# Patient Record
Sex: MALE | URBAN - METROPOLITAN AREA
[De-identification: names, ages, dates, MRNs, and addresses within clinical notes are randomized per-mention and may not be internally consistent; named-entity substitution may affect disease eponyms.]

---

## 2022-12-08 ENCOUNTER — APPOINTMENT (OUTPATIENT)
Dept: URBAN - METROPOLITAN AREA CLINIC 247 | Age: 32
Setting detail: DERMATOLOGY
End: 2022-12-09

## 2022-12-08 DIAGNOSIS — D49.2 NEOPLASM OF UNSPECIFIED BEHAVIOR OF BONE, SOFT TISSUE, AND SKIN: ICD-10-CM

## 2022-12-08 DIAGNOSIS — L40.0 PSORIASIS VULGARIS: ICD-10-CM

## 2022-12-08 PROCEDURE — A4550 SURGICAL TRAYS: HCPCS

## 2022-12-08 PROCEDURE — OTHER SHAVE REMOVAL: OTHER

## 2022-12-08 PROCEDURE — OTHER PRESCRIPTION: OTHER

## 2022-12-08 PROCEDURE — OTHER PRESCRIPTION MEDICATION MANAGEMENT: OTHER

## 2022-12-08 PROCEDURE — OTHER COUNSELING: OTHER

## 2022-12-08 PROCEDURE — 11300 SHAVE SKIN LESION 0.5 CM/<: CPT | Mod: 76

## 2022-12-08 PROCEDURE — OTHER MIPS QUALITY: OTHER

## 2022-12-08 PROCEDURE — 99214 OFFICE O/P EST MOD 30 MIN: CPT | Mod: 25

## 2022-12-08 PROCEDURE — 11300 SHAVE SKIN LESION 0.5 CM/<: CPT

## 2022-12-08 RX ORDER — TAPINAROF 10 MG/1000MG
CREAM TOPICAL
Qty: 60 | Refills: 3 | Status: CANCELLED
Stop reason: CLARIF

## 2022-12-08 ASSESSMENT — LOCATION ZONE DERM
LOCATION ZONE: SCALP
LOCATION ZONE: FACE
LOCATION ZONE: TRUNK
LOCATION ZONE: ARM
LOCATION ZONE: GENITALIA

## 2022-12-08 ASSESSMENT — LOCATION SIMPLE DESCRIPTION DERM
LOCATION SIMPLE: SCROTUM
LOCATION SIMPLE: RIGHT UPPER ARM
LOCATION SIMPLE: CHEST
LOCATION SIMPLE: RIGHT CHEEK
LOCATION SIMPLE: POSTERIOR SCALP

## 2022-12-08 ASSESSMENT — LOCATION DETAILED DESCRIPTION DERM
LOCATION DETAILED: POSTERIOR MID-PARIETAL SCALP
LOCATION DETAILED: RIGHT ANTERIOR DISTAL UPPER ARM
LOCATION DETAILED: LEFT MEDIAL SUPERIOR CHEST
LOCATION DETAILED: RIGHT SUPERIOR CENTRAL MALAR CHEEK
LOCATION DETAILED: RIGHT ANTERIOR SCROTUM

## 2022-12-08 NOTE — PROCEDURE: SHAVE REMOVAL
Render Post-Care Instructions In Note?: yes
X Size Of Lesion In Cm (Optional): 0
Hemostasis: Drysol
Size Of Lesion In Cm (Required): 0.5
Wound Care: Petrolatum
Medical Necessity Information: It is in your best interest to select a reason for this procedure from the list below. All of these items fulfill various CMS LCD requirements except the new and changing color options.
Hide Shave Removal Depth?: No
Consent was obtained from the patient. The patient was provided with the informed consent document and offered time to review and ask any further questions before signing consent. Prior to the procedure, the treatment site was clearly identified.
Biopsy Method: Personna blade
Billing Type: Third-Party Bill
Medical Necessity Clause: This procedure was medically necessary because the lesion that was treated was:
Path Notes (To The Dermatopathologist): Check margins
Notification Instructions: Patient will be notified of pathology results. However, patient should call the office if not contacted within 2 weeks.
Post-Care Instructions: I reviewed with the patient in detail post-care instructions. Keep the biopsy site dry overnight, and then wash once daily with a gentle cleanser. Afterwards, pat dry and apply Vaseline twice daily and cover with a bandage until healed. For optimal wound healing, apply SPF 30+ or keep covered until pigmentation has faded.
Detail Level: Detailed
Size Of Lesion In Cm (Required): 0.4
Depth Of Shave: dermis
Anesthesia Type: 1% lidocaine with epinephrine
Body Location Override (Optional - Billing Will Still Be Based On Selected Body Map Location If Applicable): right anterior upper arm distal
Body Location Override (Optional - Billing Will Still Be Based On Selected Body Map Location If Applicable): right anterior upper arm proximal

## 2022-12-08 NOTE — PROCEDURE: COUNSELING
Detail Level: Zone
Moisturizers Recommendations: CeraVe or Cetaphil
Patient Specific Counseling (Will Not Stick From Patient To Patient): Pt reports joint pain and swelling\\nRecommended f/u with rheumatologist for E&M of possible psoriatic arthritis\\nPreviously on Humira

## 2022-12-08 NOTE — PROCEDURE: PRESCRIPTION MEDICATION MANAGEMENT
Render In Strict Bullet Format?: No
Initiate Treatment: Vtama 1 % topical cream QD
Detail Level: Zone
Samples Given: VTAMA

## 2023-01-13 ENCOUNTER — APPOINTMENT (OUTPATIENT)
Dept: URBAN - METROPOLITAN AREA CLINIC 247 | Age: 33
Setting detail: DERMATOLOGY
End: 2023-01-13

## 2023-01-13 ENCOUNTER — RX ONLY (RX ONLY)
Age: 33
End: 2023-01-13

## 2023-01-13 DIAGNOSIS — R21 RASH AND OTHER NONSPECIFIC SKIN ERUPTION: ICD-10-CM

## 2023-01-13 DIAGNOSIS — L40.0 PSORIASIS VULGARIS: ICD-10-CM

## 2023-01-13 PROCEDURE — OTHER HUMIRA INITIATION: OTHER

## 2023-01-13 PROCEDURE — OTHER PRESCRIPTION: OTHER

## 2023-01-13 PROCEDURE — 99214 OFFICE O/P EST MOD 30 MIN: CPT

## 2023-01-13 PROCEDURE — OTHER ORDER TESTS: OTHER

## 2023-01-13 PROCEDURE — OTHER COUNSELING: OTHER

## 2023-01-13 PROCEDURE — OTHER PRESCRIPTION MEDICATION MANAGEMENT: OTHER

## 2023-01-13 PROCEDURE — OTHER ADDITIONAL NOTES: OTHER

## 2023-01-13 RX ORDER — ADALIMUMAB 40MG/0.8ML
KIT SUBCUTANEOUS Q2WEEKS
Qty: 24 | Refills: 0 | Status: ERX | COMMUNITY
Start: 2023-01-12

## 2023-01-13 RX ORDER — ADALIMUMAB 40MG/0.8ML
KIT SUBCUTANEOUS
Qty: 2 | Refills: 5 | Status: ERX

## 2023-01-13 ASSESSMENT — LOCATION SIMPLE DESCRIPTION DERM
LOCATION SIMPLE: RIGHT CHEEK
LOCATION SIMPLE: CHEST
LOCATION SIMPLE: PENIS
LOCATION SIMPLE: POSTERIOR SCALP

## 2023-01-13 ASSESSMENT — LOCATION ZONE DERM
LOCATION ZONE: FACE
LOCATION ZONE: TRUNK
LOCATION ZONE: PENIS
LOCATION ZONE: SCALP

## 2023-01-13 ASSESSMENT — LOCATION DETAILED DESCRIPTION DERM
LOCATION DETAILED: LEFT MEDIAL SUPERIOR CHEST
LOCATION DETAILED: RIGHT SUPERIOR CENTRAL MALAR CHEEK
LOCATION DETAILED: LEFT DORSAL SHAFT OF PENIS
LOCATION DETAILED: POSTERIOR MID-PARIETAL SCALP

## 2023-01-13 ASSESSMENT — BSA PSORIASIS: % BODY COVERED IN PSORIASIS: 6

## 2023-01-13 NOTE — PROCEDURE: PRESCRIPTION MEDICATION MANAGEMENT
Initiate Treatment: Humira
Discontinue Regimen: Vtama 1 % topical cream
Detail Level: Zone
Render In Strict Bullet Format?: No

## 2023-01-13 NOTE — PROCEDURE: COUNSELING
Moisturizers Recommendations: CeraVe or Cetaphil
Detail Level: Detailed
Detail Level: Zone
Patient Specific Counseling (Will Not Stick From Patient To Patient): Pt reports joint pain and swelling\\nRecommended f/u with rheumatologist for E&M of possible psoriatic arthritis\\nPreviously on Humira

## 2023-01-13 NOTE — PROCEDURE: HUMIRA INITIATION
Is Dapsone Contraindicated?: No
Pregnancy And Lactation Warning Text: This medication is Pregnancy Category B and is considered safe during pregnancy. It is unknown if this medication is excreted in breast milk.
Detail Level: Zone
Humira Dosing: 80 mg SC day 0, 40 mg SC day 7, then 40 mg SC every other week
Diagnosis (Required): Psoriasis
Humira Monitoring Guidelines: A yearly test for tuberculosis is required while taking Humira. Baseline labs ordered today

## 2023-01-13 NOTE — PROCEDURE: ADDITIONAL NOTES
Render Risk Assessment In Note?: no
Detail Level: Simple
Additional Notes: GenitAl and scalp involvement. Inadequate improvement on topical steroids. Pt has\\nPt reports joint pain/stiffness.

## 2023-02-02 ENCOUNTER — APPOINTMENT (OUTPATIENT)
Dept: URBAN - METROPOLITAN AREA CLINIC 247 | Age: 33
Setting detail: DERMATOLOGY
End: 2023-02-03

## 2023-02-02 DIAGNOSIS — L40.0 PSORIASIS VULGARIS: ICD-10-CM

## 2023-02-02 PROCEDURE — OTHER ORDER TESTS: OTHER

## 2023-04-06 ENCOUNTER — APPOINTMENT (OUTPATIENT)
Dept: URBAN - METROPOLITAN AREA CLINIC 247 | Age: 33
Setting detail: DERMATOLOGY
End: 2023-04-06

## 2023-04-06 DIAGNOSIS — L40.0 PSORIASIS VULGARIS: ICD-10-CM

## 2023-04-06 DIAGNOSIS — L60.8 OTHER NAIL DISORDERS: ICD-10-CM

## 2023-04-06 PROCEDURE — 99213 OFFICE O/P EST LOW 20 MIN: CPT

## 2023-04-06 PROCEDURE — OTHER PRESCRIPTION: OTHER

## 2023-04-06 PROCEDURE — OTHER ADDITIONAL NOTES: OTHER

## 2023-04-06 PROCEDURE — OTHER HUMIRA MONITORING: OTHER

## 2023-04-06 PROCEDURE — OTHER COUNSELING: OTHER

## 2023-04-06 PROCEDURE — OTHER PRESCRIPTION MEDICATION MANAGEMENT: OTHER

## 2023-04-06 RX ORDER — ADALIMUMAB 40MG/0.8ML
KIT SUBCUTANEOUS Q2WEEKS
Qty: 1 | Refills: 5 | Status: ERX

## 2023-04-06 RX ORDER — CLOBETASOL PROPIONATE 0.5 MG/ML
SOLUTION TOPICAL QD
Qty: 50 | Refills: 5 | Status: ERX | COMMUNITY
Start: 2023-04-06

## 2023-04-06 ASSESSMENT — LOCATION DETAILED DESCRIPTION DERM
LOCATION DETAILED: LEFT DORSAL SHAFT OF PENIS
LOCATION DETAILED: RIGHT SUPERIOR CENTRAL MALAR CHEEK
LOCATION DETAILED: LEFT INDEX FINGERNAIL
LOCATION DETAILED: LEFT THUMBNAIL
LOCATION DETAILED: RIGHT THUMBNAIL
LOCATION DETAILED: POSTERIOR MID-PARIETAL SCALP
LOCATION DETAILED: RIGHT INDEX FINGERNAIL
LOCATION DETAILED: LEFT MEDIAL SUPERIOR CHEST

## 2023-04-06 ASSESSMENT — LOCATION SIMPLE DESCRIPTION DERM
LOCATION SIMPLE: PENIS
LOCATION SIMPLE: LEFT THUMB
LOCATION SIMPLE: LEFT INDEX FINGER
LOCATION SIMPLE: RIGHT INDEX FINGER
LOCATION SIMPLE: CHEST
LOCATION SIMPLE: RIGHT HAND
LOCATION SIMPLE: RIGHT CHEEK
LOCATION SIMPLE: POSTERIOR SCALP

## 2023-04-06 ASSESSMENT — LOCATION ZONE DERM
LOCATION ZONE: PENIS
LOCATION ZONE: TRUNK
LOCATION ZONE: FACE
LOCATION ZONE: FINGERNAIL
LOCATION ZONE: SCALP

## 2023-04-06 ASSESSMENT — BSA PSORIASIS: % BODY COVERED IN PSORIASIS: 0

## 2023-04-06 NOTE — PROCEDURE: PRESCRIPTION MEDICATION MANAGEMENT
Detail Level: Zone
Render In Strict Bullet Format?: No
Initiate Treatment: Clobetasol 0.05% solution QHS for up to 2 weeks PRN.
Continue Regimen: Humira 40mg/0.8mL. Inject 1 syringe every other week.

## 2023-04-06 NOTE — PROCEDURE: COUNSELING
Patient Specific Counseling (Will Not Stick From Patient To Patient): Pt reports joint pain and swelling\\nRecommended f/u with rheumatologist for E&M of possible psoriatic arthritis\\nPreviously on Humira
Detail Level: Detailed
Moisturizers Recommendations: CeraVe or Cetaphil
Detail Level: Zone

## 2023-04-06 NOTE — PROCEDURE: ADDITIONAL NOTES
Detail Level: Simple
Additional Notes: Pt didn’t complete his bloodwork for recheck of ALT (ALT was 50 at 1/23/23 blood draw). Provided bloodwork order today with instructions to complete ASAP.\\nPt reports improvement in joint pain/stiffness since starting Humira. Recommended following up with rheumatology for further E&M. Provided Dr. Ely?s infomation.
Render Risk Assessment In Note?: no

## 2023-10-19 ENCOUNTER — RX ONLY (RX ONLY)
Age: 33
End: 2023-10-19

## 2023-10-19 RX ORDER — ADALIMUMAB 40MG/0.8ML
KIT SUBCUTANEOUS
Qty: 2 | Refills: 0 | Status: ERX

## 2023-11-16 ENCOUNTER — APPOINTMENT (OUTPATIENT)
Dept: URBAN - METROPOLITAN AREA CLINIC 247 | Age: 33
Setting detail: DERMATOLOGY
End: 2023-11-16

## 2023-11-16 DIAGNOSIS — M67.4 GANGLION: ICD-10-CM

## 2023-11-16 DIAGNOSIS — L40.0 PSORIASIS VULGARIS: ICD-10-CM

## 2023-11-16 PROBLEM — D48.5 NEOPLASM OF UNCERTAIN BEHAVIOR OF SKIN: Status: ACTIVE | Noted: 2023-11-16

## 2023-11-16 PROCEDURE — OTHER ORDER TESTS: OTHER

## 2023-11-16 PROCEDURE — OTHER PRESCRIPTION MEDICATION MANAGEMENT: OTHER

## 2023-11-16 PROCEDURE — 99213 OFFICE O/P EST LOW 20 MIN: CPT

## 2023-11-16 PROCEDURE — OTHER HUMIRA MONITORING: OTHER

## 2023-11-16 PROCEDURE — OTHER PRESCRIPTION: OTHER

## 2023-11-16 PROCEDURE — OTHER ADDITIONAL NOTES: OTHER

## 2023-11-16 PROCEDURE — OTHER COUNSELING: TOPICAL STEROIDS: OTHER

## 2023-11-16 PROCEDURE — OTHER COUNSELING: OTHER

## 2023-11-16 RX ORDER — CLOBETASOL PROPIONATE 0.5 MG/ML
SOLUTION TOPICAL QD
Qty: 50 | Refills: 5 | Status: ERX | COMMUNITY
Start: 2023-11-15

## 2023-11-16 RX ORDER — CLOBETASOL PROPIONATE 0.5 MG/G
CREAM TOPICAL BID
Qty: 60 | Refills: 3 | Status: ERX | COMMUNITY
Start: 2023-11-15

## 2023-11-16 RX ORDER — ADALIMUMAB 40MG/0.8ML
KIT SUBCUTANEOUS Q2WEEKS
Qty: 1 | Refills: 2 | Status: ERX | COMMUNITY
Start: 2023-11-15

## 2023-11-16 ASSESSMENT — LOCATION DETAILED DESCRIPTION DERM
LOCATION DETAILED: POSTERIOR MID-PARIETAL SCALP
LOCATION DETAILED: RIGHT SUPERIOR CENTRAL MALAR CHEEK
LOCATION DETAILED: RIGHT INDEX DISTAL INTERPHALANGEAL JOINT
LOCATION DETAILED: LEFT DORSAL SHAFT OF PENIS
LOCATION DETAILED: LEFT DORSAL THUMB METACARPOPHALANGEAL JOINT

## 2023-11-16 ASSESSMENT — LOCATION ZONE DERM
LOCATION ZONE: SCALP
LOCATION ZONE: PENIS
LOCATION ZONE: HAND
LOCATION ZONE: FINGER
LOCATION ZONE: FACE

## 2023-11-16 ASSESSMENT — BSA PSORIASIS: % BODY COVERED IN PSORIASIS: 1

## 2023-11-16 ASSESSMENT — LOCATION SIMPLE DESCRIPTION DERM
LOCATION SIMPLE: LEFT HAND
LOCATION SIMPLE: RIGHT INDEX FINGER
LOCATION SIMPLE: PENIS
LOCATION SIMPLE: RIGHT CHEEK
LOCATION SIMPLE: POSTERIOR SCALP

## 2023-11-16 NOTE — PROCEDURE: ADDITIONAL NOTES
Render Risk Assessment In Note?: no
Detail Level: Simple
Additional Notes: Recommended following up with a hand surgeon for removal.
Additional Notes: Pt reports joint pain/stiffness. Recommended following up with rheumatology for further E&M. Provided Dr. Burdick’s information.\\nLabs due in January.

## 2023-11-16 NOTE — PROCEDURE: COUNSELING
Patient Specific Counseling (Will Not Stick From Patient To Patient): Pt reports joint pain and swelling\\nRecommended f/u with rheumatologist for E&M of possible psoriatic arthritis
Moisturizers Recommendations: CeraVe or Cetaphil
Detail Level: Zone
Detail Level: Simple

## 2024-01-24 RX ORDER — ADALIMUMAB 40MG/0.8ML
KIT SUBCUTANEOUS Q2WEEKS
Qty: 1 | Refills: 5 | Status: ERX

## 2024-04-25 ENCOUNTER — APPOINTMENT (OUTPATIENT)
Dept: URBAN - METROPOLITAN AREA CLINIC 247 | Age: 34
Setting detail: DERMATOLOGY
End: 2024-04-25

## 2024-04-25 DIAGNOSIS — L40.0 PSORIASIS VULGARIS: ICD-10-CM

## 2024-04-25 DIAGNOSIS — R21 RASH AND OTHER NONSPECIFIC SKIN ERUPTION: ICD-10-CM

## 2024-04-25 PROCEDURE — OTHER ORDER TESTS: OTHER

## 2024-04-25 PROCEDURE — OTHER COUNSELING: TOPICAL STEROIDS: OTHER

## 2024-04-25 PROCEDURE — OTHER MIPS QUALITY: OTHER

## 2024-04-25 PROCEDURE — OTHER ADDITIONAL NOTES: OTHER

## 2024-04-25 PROCEDURE — OTHER HUMIRA MONITORING: OTHER

## 2024-04-25 PROCEDURE — OTHER COUNSELING: OTHER

## 2024-04-25 PROCEDURE — 99213 OFFICE O/P EST LOW 20 MIN: CPT

## 2024-04-25 PROCEDURE — OTHER PRESCRIPTION: OTHER

## 2024-04-25 PROCEDURE — OTHER PRESCRIPTION MEDICATION MANAGEMENT: OTHER

## 2024-04-25 RX ORDER — CLOBETASOL PROPIONATE 0.5 MG/G
CREAM TOPICAL BID
Qty: 60 | Refills: 3

## 2024-04-25 RX ORDER — ADALIMUMAB 40MG/0.8ML
KIT SUBCUTANEOUS Q2WEEKS
Qty: 1 | Refills: 5 | Status: CANCELLED
Stop reason: CLARIF

## 2024-04-25 RX ORDER — CLOBETASOL PROPIONATE 0.5 MG/ML
SOLUTION TOPICAL QD
Qty: 50 | Refills: 5

## 2024-04-25 ASSESSMENT — LOCATION ZONE DERM
LOCATION ZONE: TRUNK
LOCATION ZONE: FACE
LOCATION ZONE: FINGER
LOCATION ZONE: SCALP
LOCATION ZONE: PENIS

## 2024-04-25 ASSESSMENT — LOCATION SIMPLE DESCRIPTION DERM
LOCATION SIMPLE: ABDOMEN
LOCATION SIMPLE: RIGHT CHEEK
LOCATION SIMPLE: PENIS
LOCATION SIMPLE: GROIN
LOCATION SIMPLE: RIGHT INDEX FINGER
LOCATION SIMPLE: POSTERIOR SCALP

## 2024-04-25 ASSESSMENT — LOCATION DETAILED DESCRIPTION DERM
LOCATION DETAILED: RIGHT SUPERIOR CENTRAL MALAR CHEEK
LOCATION DETAILED: SUPRAPUBIC SKIN
LOCATION DETAILED: RIGHT INDEX DISTAL INTERPHALANGEAL JOINT
LOCATION DETAILED: POSTERIOR MID-PARIETAL SCALP
LOCATION DETAILED: LEFT DORSAL SHAFT OF PENIS
LOCATION DETAILED: EPIGASTRIC SKIN

## 2024-04-25 ASSESSMENT — BSA PSORIASIS: % BODY COVERED IN PSORIASIS: 6

## 2024-04-25 NOTE — PROCEDURE: COUNSELING
Patient Specific Counseling (Will Not Stick From Patient To Patient): No joint pain and swelling
Moisturizers Recommendations: CeraVe or Cetaphil
Detail Level: Zone

## 2024-04-25 NOTE — PROCEDURE: ADDITIONAL NOTES
Detail Level: Simple
Render Risk Assessment In Note?: no
Additional Notes: Pt reports h/o rash on legs\\nResolved at present\\nAdvised to call to RTC for E&M if recurrent
present

## 2024-04-25 NOTE — PROCEDURE: ORDER TESTS
Billing Type: Third-Party Bill
Performing Laboratory: [id number]
Expected Date Of Service: 11/15/2023
Bill For Surgical Tray: no

## 2024-05-09 RX ORDER — ADALIMUMAB 40MG/0.8ML
KIT SUBCUTANEOUS Q2WEEKS
Qty: 1 | Refills: 5 | Status: ERX

## 2024-05-16 RX ORDER — ADALIMUMAB 40MG/0.8ML
KIT SUBCUTANEOUS Q2WEEKS
Qty: 1 | Refills: 5 | Status: ERX

## 2024-05-16 RX ORDER — CLOBETASOL PROPIONATE 0.5 MG/ML
SOLUTION TOPICAL QD
Qty: 50 | Refills: 5 | Status: ERX

## 2024-05-16 RX ORDER — CLOBETASOL PROPIONATE 0.5 MG/G
CREAM TOPICAL BID
Qty: 60 | Refills: 3 | Status: ERX

## 2024-06-03 ENCOUNTER — RX ONLY (RX ONLY)
Age: 34
End: 2024-06-03

## 2024-06-03 RX ORDER — ADALIMUMAB-BWWD 40 MG/.4ML
SOLUTION SUBCUTANEOUS
Qty: 0.8 | Refills: 5 | Status: ERX | COMMUNITY
Start: 2024-06-03

## 2024-12-09 ENCOUNTER — RX ONLY (RX ONLY)
Age: 34
End: 2024-12-09

## 2024-12-09 RX ORDER — ADALIMUMAB-BWWD 40 MG/.4ML
SOLUTION SUBCUTANEOUS
Qty: 0.8 | Refills: 5 | Status: ERX

## 2024-12-11 ENCOUNTER — RX ONLY (RX ONLY)
Age: 34
End: 2024-12-11

## 2024-12-11 RX ORDER — ADALIMUMAB-BWWD 40 MG/.4ML
SOLUTION SUBCUTANEOUS
Qty: 0.8 | Refills: 5 | Status: ERX

## 2025-01-23 ENCOUNTER — APPOINTMENT (OUTPATIENT)
Dept: URBAN - METROPOLITAN AREA CLINIC 247 | Age: 35
Setting detail: DERMATOLOGY
End: 2025-01-23

## 2025-01-23 ENCOUNTER — RX ONLY (RX ONLY)
Age: 35
End: 2025-01-23

## 2025-01-23 DIAGNOSIS — L40.0 PSORIASIS VULGARIS: ICD-10-CM

## 2025-01-23 DIAGNOSIS — L738 OTHER SPECIFIED DISEASES OF HAIR AND HAIR FOLLICLES: ICD-10-CM

## 2025-01-23 DIAGNOSIS — L663 OTHER SPECIFIED DISEASES OF HAIR AND HAIR FOLLICLES: ICD-10-CM

## 2025-01-23 DIAGNOSIS — B07.8 OTHER VIRAL WARTS: ICD-10-CM

## 2025-01-23 PROBLEM — L02.223 FURUNCLE OF CHEST WALL: Status: ACTIVE | Noted: 2025-01-23

## 2025-01-23 PROCEDURE — 99213 OFFICE O/P EST LOW 20 MIN: CPT | Mod: 25

## 2025-01-23 PROCEDURE — OTHER HUMIRA MONITORING: OTHER

## 2025-01-23 PROCEDURE — OTHER DEFER: OTHER

## 2025-01-23 PROCEDURE — OTHER COUNSELING: TOPICAL STEROIDS: OTHER

## 2025-01-23 PROCEDURE — OTHER PRESCRIPTION: OTHER

## 2025-01-23 PROCEDURE — OTHER PRESCRIPTION MEDICATION MANAGEMENT: OTHER

## 2025-01-23 PROCEDURE — OTHER LIQUID NITROGEN: OTHER

## 2025-01-23 PROCEDURE — 17110 DESTRUCT B9 LESION 1-14: CPT

## 2025-01-23 PROCEDURE — OTHER COUNSELING: OTHER

## 2025-01-23 PROCEDURE — OTHER MIPS QUALITY: OTHER

## 2025-01-23 PROCEDURE — OTHER ADDITIONAL NOTES: OTHER

## 2025-01-23 RX ORDER — CLINDAMYCIN PHOSPHATE 10 MG/ML
LOTION TOPICAL
Qty: 60 | Refills: 5 | Status: ERX | COMMUNITY
Start: 2025-01-22

## 2025-01-23 RX ORDER — ADALIMUMAB-BWWD 40 MG/.4ML
SOLUTION SUBCUTANEOUS
Qty: 0.4 | Refills: 5 | Status: CANCELLED

## 2025-01-23 RX ORDER — CALCIPOTRIENE 50 UG/G
CREAM TOPICAL
Qty: 60 | Refills: 5 | Status: ERX | COMMUNITY
Start: 2025-01-22

## 2025-01-23 RX ORDER — ADALIMUMAB-BWWD 40 MG/.4ML
SOLUTION SUBCUTANEOUS
Qty: 0.8 | Refills: 5 | Status: ERX

## 2025-01-23 ASSESSMENT — LOCATION ZONE DERM
LOCATION ZONE: FINGER
LOCATION ZONE: SCALP
LOCATION ZONE: PENIS
LOCATION ZONE: TRUNK
LOCATION ZONE: FACE

## 2025-01-23 ASSESSMENT — LOCATION SIMPLE DESCRIPTION DERM
LOCATION SIMPLE: POSTERIOR SCALP
LOCATION SIMPLE: CHEST
LOCATION SIMPLE: LEFT MIDDLE FINGER
LOCATION SIMPLE: RIGHT CHEEK
LOCATION SIMPLE: PENIS
LOCATION SIMPLE: RIGHT INDEX FINGER
LOCATION SIMPLE: GROIN

## 2025-01-23 ASSESSMENT — LOCATION DETAILED DESCRIPTION DERM
LOCATION DETAILED: SUPRAPUBIC SKIN
LOCATION DETAILED: POSTERIOR MID-PARIETAL SCALP
LOCATION DETAILED: LEFT DISTAL PALMAR MIDDLE FINGER
LOCATION DETAILED: RIGHT SUPERIOR CENTRAL MALAR CHEEK
LOCATION DETAILED: RIGHT INDEX DISTAL INTERPHALANGEAL JOINT
LOCATION DETAILED: STERNUM
LOCATION DETAILED: LEFT DORSAL SHAFT OF PENIS

## 2025-01-23 NOTE — PROCEDURE: ADDITIONAL NOTES
Detail Level: Simple
Render Risk Assessment In Note?: no
Additional Notes: -Pt reports recent flares on penis and scalp. \\n-1% BSA. Pt does report hand swelling, stiffness and pain worst in the morning. \\n-Advised that pt see a Rheumatologist for evaluation for psoriatic arthritis

## 2025-01-23 NOTE — PROCEDURE: COUNSELING
Moisturizers Recommendations: CeraVe or Cetaphil
Detail Level: Zone
Detail Level: Detailed
Detail Level: Simple

## 2025-01-23 NOTE — PROCEDURE: DEFER
Introduction Text (Please End With A Colon): The following procedure was deferred: topical prescription
Size Of Lesion In Cm (Optional): 0
Detail Level: Detailed

## 2025-01-23 NOTE — PROCEDURE: PRESCRIPTION MEDICATION MANAGEMENT
Detail Level: Zone
Render In Strict Bullet Format?: No
Initiate Treatment: Calcipotriene topical cream apply to AA QD
Continue Regimen: Humira 40mg/0.8mL.(generic Hadlima) Inject 1 syringe every other week.\\nClobetasol 0.05% solution QHS x 1 week, then 3x/week PRN\\nClobetasol 0.05% topical cream BID x 1 week, then 3x/week PRN flare
Initiate Treatment: Clindamycin topical lotion apply to chest daily

## 2025-01-23 NOTE — PROCEDURE: HUMIRA MONITORING
Patient Reported Weight(Optional But Include Units): 185
Detail Level: Zone
Add High Risk Medication Management Associated Diagnosis?: No
Length Of Therapy: 3 years

## 2025-01-23 NOTE — PROCEDURE: LIQUID NITROGEN
Show Applicator Variable?: Yes
Number Of Freeze-Thaw Cycles: 1 freeze-thaw cycle
Render Post-Care Instructions In Note?: no
Duration Of Freeze Thaw-Cycle (Seconds): 2
Detail Level: Detailed
Post-Care Instructions: I reviewed with the patient in detail post-care instructions. Patient is to wear sunprotection, and avoid picking at any of the treated lesions. Pt may apply Vaseline to crusted or scabbing areas.
Medical Necessity Information: It is in your best interest to select a reason for this procedure from the list below. All of these items fulfill various CMS LCD requirements except the new and changing color options.
Consent: The patient's consent was obtained including but not limited to risks of crusting, scabbing, blistering, scarring, darker or lighter pigmentary change, recurrence, incomplete removal and infection.
Medical Necessity Clause: This procedure was medically necessary because the lesions that were treated were:
Spray Paint Text: The liquid nitrogen was applied to the skin utilizing a spray paint frosting technique.

## 2025-02-14 ENCOUNTER — APPOINTMENT (OUTPATIENT)
Dept: URBAN - METROPOLITAN AREA CLINIC 247 | Age: 35
Setting detail: DERMATOLOGY
End: 2025-02-14

## 2025-02-14 DIAGNOSIS — B07.8 OTHER VIRAL WARTS: ICD-10-CM

## 2025-02-14 PROCEDURE — OTHER COUNSELING: OTHER

## 2025-02-14 PROCEDURE — OTHER ADDITIONAL NOTES: OTHER

## 2025-02-14 PROCEDURE — 99212 OFFICE O/P EST SF 10 MIN: CPT

## 2025-02-14 NOTE — PROCEDURE: ADDITIONAL NOTES
Additional Notes: Pared with 15 blade. No signs of residual wart. Clear on exam. RTC if returns. Recommended vaseline and bandaid to promote healing.
Render Risk Assessment In Note?: no
Detail Level: Simple

## 2025-05-09 ENCOUNTER — APPOINTMENT (OUTPATIENT)
Dept: URBAN - METROPOLITAN AREA CLINIC 247 | Age: 35
Setting detail: DERMATOLOGY
End: 2025-05-09

## 2025-05-09 DIAGNOSIS — M31.0 HYPERSENSITIVITY ANGIITIS: ICD-10-CM

## 2025-05-09 PROCEDURE — OTHER COUNSELING: OTHER

## 2025-05-09 PROCEDURE — OTHER ORDER TESTS: OTHER

## 2025-05-09 PROCEDURE — 99214 OFFICE O/P EST MOD 30 MIN: CPT

## 2025-05-09 PROCEDURE — OTHER MIPS QUALITY: OTHER

## 2025-05-09 PROCEDURE — OTHER PRESCRIPTION MEDICATION MANAGEMENT: OTHER

## 2025-05-09 PROCEDURE — OTHER ADDITIONAL NOTES: OTHER

## 2025-05-09 PROCEDURE — OTHER MEDICATION COUNSELING: OTHER

## 2025-05-09 ASSESSMENT — LOCATION SIMPLE DESCRIPTION DERM
LOCATION SIMPLE: LEFT PRETIBIAL REGION
LOCATION SIMPLE: RIGHT PRETIBIAL REGION

## 2025-05-09 ASSESSMENT — LOCATION ZONE DERM: LOCATION ZONE: LEG

## 2025-05-09 ASSESSMENT — LOCATION DETAILED DESCRIPTION DERM
LOCATION DETAILED: RIGHT PROXIMAL PRETIBIAL REGION
LOCATION DETAILED: LEFT PROXIMAL PRETIBIAL REGION

## 2025-07-24 ENCOUNTER — RX ONLY (RX ONLY)
Age: 35
End: 2025-07-24

## 2025-07-24 ENCOUNTER — APPOINTMENT (OUTPATIENT)
Dept: URBAN - METROPOLITAN AREA CLINIC 247 | Age: 35
Setting detail: DERMATOLOGY
End: 2025-07-24

## 2025-07-24 DIAGNOSIS — M31.0 HYPERSENSITIVITY ANGIITIS: ICD-10-CM

## 2025-07-24 DIAGNOSIS — L40.0 PSORIASIS VULGARIS: ICD-10-CM

## 2025-07-24 PROCEDURE — OTHER HUMIRA MONITORING: OTHER

## 2025-07-24 PROCEDURE — 99214 OFFICE O/P EST MOD 30 MIN: CPT

## 2025-07-24 PROCEDURE — OTHER MIPS QUALITY: OTHER

## 2025-07-24 PROCEDURE — OTHER COUNSELING: OTHER

## 2025-07-24 PROCEDURE — OTHER PRESCRIPTION MEDICATION MANAGEMENT: OTHER

## 2025-07-24 PROCEDURE — OTHER ADDITIONAL NOTES: OTHER

## 2025-07-24 PROCEDURE — OTHER PRESCRIPTION: OTHER

## 2025-07-24 PROCEDURE — OTHER MEDICATION COUNSELING: OTHER

## 2025-07-24 PROCEDURE — OTHER COUNSELING: TOPICAL STEROIDS: OTHER

## 2025-07-24 RX ORDER — CLOBETASOL PROPIONATE 0.5 MG/ML
SOLUTION TOPICAL QD
Qty: 50 | Refills: 5 | Status: ERX

## 2025-07-24 RX ORDER — KETOCONAZOLE 20 MG/ML
SHAMPOO, SUSPENSION TOPICAL
Qty: 120 | Refills: 11 | Status: ERX | COMMUNITY
Start: 2025-07-24

## 2025-07-24 RX ORDER — ADALIMUMAB-BWWD 40 MG/.4ML
SOLUTION SUBCUTANEOUS
Qty: 0.8 | Refills: 5 | Status: ERX

## 2025-07-24 ASSESSMENT — LOCATION ZONE DERM
LOCATION ZONE: LEG
LOCATION ZONE: FACE
LOCATION ZONE: FINGER
LOCATION ZONE: PENIS
LOCATION ZONE: SCALP
LOCATION ZONE: TRUNK

## 2025-07-24 ASSESSMENT — LOCATION SIMPLE DESCRIPTION DERM
LOCATION SIMPLE: RIGHT PRETIBIAL REGION
LOCATION SIMPLE: CHEST
LOCATION SIMPLE: POSTERIOR SCALP
LOCATION SIMPLE: RIGHT CHEEK
LOCATION SIMPLE: RIGHT INDEX FINGER
LOCATION SIMPLE: PENIS
LOCATION SIMPLE: LEFT PRETIBIAL REGION
LOCATION SIMPLE: GROIN

## 2025-07-24 ASSESSMENT — LOCATION DETAILED DESCRIPTION DERM
LOCATION DETAILED: RIGHT PROXIMAL PRETIBIAL REGION
LOCATION DETAILED: SUPRAPUBIC SKIN
LOCATION DETAILED: RIGHT INDEX DISTAL INTERPHALANGEAL JOINT
LOCATION DETAILED: POSTERIOR MID-PARIETAL SCALP
LOCATION DETAILED: RIGHT MEDIAL INFERIOR CHEST
LOCATION DETAILED: LEFT PROXIMAL PRETIBIAL REGION
LOCATION DETAILED: LEFT DORSAL SHAFT OF PENIS
LOCATION DETAILED: RIGHT SUPERIOR CENTRAL MALAR CHEEK